# Patient Record
Sex: MALE | Race: WHITE | NOT HISPANIC OR LATINO | URBAN - METROPOLITAN AREA
[De-identification: names, ages, dates, MRNs, and addresses within clinical notes are randomized per-mention and may not be internally consistent; named-entity substitution may affect disease eponyms.]

---

## 2024-01-03 ENCOUNTER — OCCUPATIONAL HEALTH (OUTPATIENT)
Dept: URGENT CARE | Facility: CLINIC | Age: 39
End: 2024-01-03

## 2024-01-03 PROCEDURE — 80305 DRUG TEST PRSMV DIR OPT OBS: CPT | Mod: S$GLB,,,

## 2024-09-03 ENCOUNTER — HOSPITAL ENCOUNTER (EMERGENCY)
Facility: HOSPITAL | Age: 39
Discharge: HOME | End: 2024-09-03
Attending: EMERGENCY MEDICINE

## 2024-09-03 VITALS
RESPIRATION RATE: 18 BRPM | WEIGHT: 256 LBS | TEMPERATURE: 98 F | SYSTOLIC BLOOD PRESSURE: 138 MMHG | DIASTOLIC BLOOD PRESSURE: 82 MMHG | HEART RATE: 80 BPM | OXYGEN SATURATION: 97 %

## 2024-09-03 DIAGNOSIS — G51.0 BELL'S PALSY: Primary | ICD-10-CM

## 2024-09-03 LAB
BASOPHILS # BLD AUTO: 0.07 K/UL (ref 0–0.2)
BASOPHILS NFR BLD: 0.9 % (ref 0–1.9)
DIFFERENTIAL METHOD BLD: NORMAL
EOSINOPHIL # BLD AUTO: 0.1 K/UL (ref 0–0.5)
EOSINOPHIL NFR BLD: 1.2 % (ref 0–8)
ERYTHROCYTE [DISTWIDTH] IN BLOOD BY AUTOMATED COUNT: 11.8 % (ref 11.5–14.5)
GLUCOSE SERPL-MCNC: 342 MG/DL (ref 70–110)
HCT VFR BLD AUTO: 46.4 % (ref 40–54)
HGB BLD-MCNC: 16 G/DL (ref 14–18)
IMM GRANULOCYTES # BLD AUTO: 0.01 K/UL (ref 0–0.04)
IMM GRANULOCYTES NFR BLD AUTO: 0.1 % (ref 0–0.5)
LYMPHOCYTES # BLD AUTO: 2.3 K/UL (ref 1–4.8)
LYMPHOCYTES NFR BLD: 27.8 % (ref 18–48)
MCH RBC QN AUTO: 30.2 PG (ref 27–31)
MCHC RBC AUTO-ENTMCNC: 34.5 G/DL (ref 32–36)
MCV RBC AUTO: 88 FL (ref 82–98)
MONOCYTES # BLD AUTO: 0.6 K/UL (ref 0.3–1)
MONOCYTES NFR BLD: 6.8 % (ref 4–15)
NEUTROPHILS # BLD AUTO: 5.2 K/UL (ref 1.8–7.7)
NEUTROPHILS NFR BLD: 63.2 % (ref 38–73)
NRBC BLD-RTO: 0 /100 WBC
OHS QRS DURATION: 96 MS
OHS QTC CALCULATION: 423 MS
PLATELET # BLD AUTO: 208 K/UL (ref 150–450)
PMV BLD AUTO: 11.6 FL (ref 9.2–12.9)
RBC # BLD AUTO: 5.3 M/UL (ref 4.6–6.2)
WBC # BLD AUTO: 8.19 K/UL (ref 3.9–12.7)

## 2024-09-03 PROCEDURE — 82962 GLUCOSE BLOOD TEST: CPT

## 2024-09-03 PROCEDURE — 85025 COMPLETE CBC W/AUTO DIFF WBC: CPT | Performed by: EMERGENCY MEDICINE

## 2024-09-03 PROCEDURE — 99285 EMERGENCY DEPT VISIT HI MDM: CPT | Mod: 25

## 2024-09-03 RX ORDER — VALACYCLOVIR HYDROCHLORIDE 1 G/1
1000 TABLET, FILM COATED ORAL 3 TIMES DAILY
Qty: 21 TABLET | Refills: 0 | Status: SHIPPED | OUTPATIENT
Start: 2024-09-03 | End: 2024-09-10

## 2024-09-03 NOTE — ED PROVIDER NOTES
Encounter Date: 9/3/2024       History     Chief Complaint   Patient presents with    Facial Droop     Left side facial droop 5pm last night .      Patient presents complaining of left-sided facial droop since last night.  Patient states he initially noticed drooling yesterday around 5:00 p.m..  Symptoms persisted so he came to the ER.  Patient states he recently used a lidocaine cream on his left shoulder in his wondering if that cause the facial droop.  Patient has chronic left shoulder pain.  Patient denies any weakness to the extremities.  No change in vision.  He denies any severe headache.  At the worst symptoms are mild-to-moderate.  Patient also notes changes to taste to the left side of the tongue.      Review of patient's allergies indicates:  No Known Allergies  No past medical history on file.  No past surgical history on file.  No family history on file.     Review of Systems   All other systems reviewed and are negative.      Physical Exam     Initial Vitals [09/03/24 1204]   BP Pulse Resp Temp SpO2   (!) 175/96 89 20 98.4 °F (36.9 °C) 96 %      MAP       --         Physical Exam    Nursing note and vitals reviewed.  Constitutional: He appears well-developed and well-nourished. He is not diaphoretic. No distress.   HENT:   Head: Normocephalic and atraumatic.   Eyes: EOM are normal.   Neck: Neck supple.   Normal range of motion.  Cardiovascular:  Intact distal pulses.           Pulmonary/Chest: No respiratory distress.   Musculoskeletal:      Cervical back: Normal range of motion and neck supple.     Neurological: He is alert and oriented to person, place, and time.   Vision-normal  Neglect-normal  Aphasia - normal  Pronator drift - normal  Cerebellum - normal    There indeed is left-sided facial droop.  Patient has inability to close his left eye tightly or wrinkling of the forehead.   Skin: Skin is warm and dry.   Psychiatric: He has a normal mood and affect. His behavior is normal. Judgment and  thought content normal.         ED Course   Procedures  Labs Reviewed   POCT GLUCOSE - Abnormal       Result Value    POC Glucose 342 (*)    CBC W/ AUTO DIFFERENTIAL    WBC 8.19      RBC 5.30      Hemoglobin 16.0      Hematocrit 46.4      MCV 88      MCH 30.2      MCHC 34.5      RDW 11.8      Platelets 208      MPV 11.6      Immature Granulocytes 0.1      Gran # (ANC) 5.2      Immature Grans (Abs) 0.01      Lymph # 2.3      Mono # 0.6      Eos # 0.1      Baso # 0.07      nRBC 0      Gran % 63.2      Lymph % 27.8      Mono % 6.8      Eosinophil % 1.2      Basophil % 0.9      Differential Method Automated     POCT GLUCOSE MONITORING CONTINUOUS        ECG Results              ECG 12 lead (In process)        Collection Time Result Time QRS Duration [facility] QTC Calculation    09/03/24 12:26:23 09/03/24 12:53:54 96 423                     In process by Interface, Lab In [facility] (09/03/24 12:53:59)                   Narrative:    Test Reason : R29.818,    Vent. Rate : 077 BPM     Atrial Rate : 077 BPM     P-R Int : 148 ms          QRS Dur : 096 ms      QT Int : 374 ms       P-R-T Axes : 060 050 042 degrees     QTc Int : 423 ms    Normal sinus rhythm  Normal ECG  No previous ECGs available    Referred By: [email or web address]   SELF           Confirmed By:                                   Imaging Results              CT Head Without Contrast (Final result)  Result time 09/03/24 12:27:49   Procedure changed from CT HEAD FOR STROKE     Final result by Compa Villalobos MD (09/03/24 12:27:49)                   Impression:      No acute intracranial abnormality.      Electronically signed by: Compa Villalobos  Date:    09/03/2024  Time:    12:27               Narrative:    EXAMINATION:  CT HEAD WITHOUT CONTRAST    CLINICAL HISTORY:  Neuro deficit, acute, stroke suspected;    TECHNIQUE:  Head CT without IV contrast.    COMPARISON:  None    FINDINGS:  Gray-white differentiation is maintained without hemorrhage, midline shift, or mass effect. Few tiny  parenchymal calcifications are noted bilaterally, chronic in nature.    The ventricles and cisterns are maintained.    Calvarium is intact. Visualized sinuses are clear.                                       Medications - No data to display  Medical Decision Making  Patient is in no distress    Considerations include stroke, Bell's palsy    Patient's exam is most consistent with Bell's palsy.  Patient's head CT is within normal limits.  He was counseled on Lacri-Lube for the left eye as well as Valtrex therapy.  Prednisone is not a good option as patient was an uncontrolled diabetic with high blood sugar.  Patient was advised to follow up with Neurology.  Patient will be discharged in stable condition.  Detailed return precautions discussed.        Amount and/or Complexity of Data Reviewed  Labs: ordered.  Radiology: ordered.    Risk  OTC drugs.  Prescription drug management.                                      Clinical Impression:  Final diagnoses:  [G51.0] Bell's palsy (Primary)          ED Disposition Condition    Discharge Stable          ED Prescriptions       Medication Sig Dispense Start Date End Date Auth. Provider    valACYclovir (VALTREX) 1000 MG tablet Take 1 tablet (1,000 mg total) by mouth 3 (three) times daily. for 7 days 21 tablet 9/3/2024 9/10/2024 Ian Paz MD    white petrolatum-mineral oil 56.8-42.5% (LACRI-LUBE S.O.P.) 56.8-42.5 % Oint Place into the left eye every evening. 15 g 9/3/2024 -- Ian Paz MD          Follow-up Information       Follow up With Specialties Details Why Contact Info    Kurt Nesbitt MD Vascular Neurology, Neurology Schedule an appointment as soon as possible for a visit in 2 days  [address] [facility] [address]  [phone]               Ian Paz MD  09/03/24 4855